# Patient Record
Sex: MALE | Race: WHITE
[De-identification: names, ages, dates, MRNs, and addresses within clinical notes are randomized per-mention and may not be internally consistent; named-entity substitution may affect disease eponyms.]

---

## 2018-01-12 NOTE — OR
DATE OF PROCEDURE:  01/11/2018

 

PROCEDURE:  Laparoscopic ventral hernia repair x2.

 

FINDINGS:

1. 1 cm hernia directly superior to previous repair.

2. 1.5 cm hernia around umbilicus.

 

COMPLICATIONS:  None.

 

ASSISTANT:  None.

 

ANESTHESIA:  General/local.

 

INDICATIONS:  This is a pleasant gentleman, who had a previous ventral hernia repair

____________________.

 

RISKS:  Risks, benefits, alternatives, and limitations including, but not limited to

infection, bleeding, and injury to abdominal structures such as bowel, bladder, and

requirement of open surgery were explained to the patient, who wished to proceed.

 

PROCEDURE IN DETAIL:  The patient was placed in left lateral decubitus position.  The hernia

would be addressed as follows:  An incision was made in the right abdomen.  This was

approximately 50 mm in size, and a Veress needle was used to enter the abdomen without

abnormality.  A drop test was performed without abnormality.  The abdomen was subsequently

insufflated, and this was followed by an Optiview trocar.  Two additional 5 mm ports were

also placed in the right lower and right upper abdomen.  Lysis of adhesions was then

commenced after the Optiview was introduced, and no evidence of enterotomy is noted.  The

adhesions were moderate.

 

The previous mesh was noted to be intact without abnormality.  The 2 previously mentioned

hernias were identified.  These both would be replaced with 7.5 cm circular mesh.  These

were both repaired in the same fashion by creating a defect in the skin, passing a suture

through, and then tacking into place.  Once this was completed, the abdomen was inspected

for abnormality, which there was none.  The remainder of the abdomen was inspected without

abnormality again a second time.  The air was desufflated.  The wound was closed with 3-0

Vicryl and 4-0 Vicryl interrupted in running fashion, and Dermabond was applied.  The

patient tolerated the procedure well.

 

 

 

 

Wolfgang Arvizu MD

DD:  01/11/2018 17:21:47

DT:  01/11/2018 23:23:14

Job #:  [id number]

## 2018-01-18 NOTE — DISCH
DISCHARGE DIAGNOSIS:  Status post laparoscopic ventral hernia.

 

HOSPITAL COURSE:  A pleasant 32-year-old male who underwent an uneventful laparoscopic

ventral hernia.

 

FOLLOWUP:  Follow up with Surgery in 7 to 14 days.

 

DISCHARGE MEDICATIONS:  Please see MAR, but include Norco for pain.

 

ACTIVITY:  No lifting greater than 30 pounds x30 days.

 

DIET:  As tolerated.

 

DRESSINGS:  No dressing changes needed as Dermabond was used.

## 2020-03-02 NOTE — CRLUS
Indication:



Right upper abdominal pain.



Technique:



Grayscale and color Doppler ultrasound the right upper quadrant was 

performed.



Comparison:



CT scan of the abdomen and pelvis dated February 10, 2020.



Findings:



The pancreas is not well visualized. Aorta is not well visualized. The 

liver is enlarged and has a coarsened echotexture. No intrahepatic biliary 

ductal dilatation is identified. No intrahepatic masses identified. The 

main portal vein is patent.



Sludge is identified within the gallbladder. A questionable 5 millimeter 

polyp versus stone is identified. This is a non shadowing nonmobile 

adherent mass. The gallbladder wall measures 2 mm in thickness. Sonographic 

Carrillo`s sign is not elicited. The common bile duct measures 5 mm and seen. 

The right kidney measures 11.5 x 5.1 x 4.5 cm in size. The cortex measures 

1.1 cm. No hydronephrosis is identified. The inferior vena cava is patent. 

No free fluid is identified in the right upper quadrant.



Impression:



Stone versus tumefactive sludge versus polyp measuring 5 millimeters within 

the gallbladder.



Hepatomegaly. Questionable diffuse fatty infiltration of the liver.



Dictated by Tiffanie Mueller MD @ Mar  2 2020  7:02PM



Signed by Dr. Tiffanie Mueller @ Mar  2 2020  7:04PM

## 2020-03-02 NOTE — EDM.PDOC
ED HPI GENERAL MEDICAL PROBLEM





- General


Chief Complaint: Chest Pain


Stated Complaint: SOB,CHEST PAIN


Time Seen by Provider: 03/02/20 16:57


Source of Information: Reports: Patient


History Limitations: Reports: No Limitations





- History of Present Illness


INITIAL COMMENTS - FREE TEXT/NARRATIVE: 





pt arrived stating that he is having pain in his chest going up to his neck. 

After he was here for a few  minutes the abdoman seemed to be the source of his 

pain. 


Onset: Today, Sudden


Duration: Hour(s):


Location: Reports: Abdomen


Associated Symptoms: Reports: Nausea/Vomiting, Other (pt did have chest pain in 

addition to the abdomanal pain)


  ** Left Chest


Pain Score (Numeric/FACES): 5





- Related Data


 Allergies











Allergy/AdvReac Type Severity Reaction Status Date / Time


 


acetaminophen [From Vicodin] Allergy  Itching Verified 01/10/18 10:07


 


hydrocodone [From Vicodin] Allergy  Itching Verified 01/10/18 10:07











Home Meds: 


 Home Meds





Omeprazole 20 mg PO DAILY 11/02/13 [History]


lisinopriL [Prinivil] 20 mg PO DAILY 11/02/13 [History]


hydroCHLOROthiazide [Hydrochlorothiazide] 25 mg PO DAILY 11/29/16 [History]


DULoxetine HCl [Cymbalta] 60 mg PO DAILY 01/10/18 [History]











Past Medical History


HEENT History: Reports: Impaired Vision


Cardiovascular History: Reports: Hypertension


Respiratory History: Reports: Asthma, Other (See Below)


Other Respiratory History: hx asthma when younger, no problems now


Gastrointestinal History: Reports: GERD


Genitourinary History: Reports: Renal Calculus


Musculoskeletal History: Reports: Amputation


Psychiatric History: Reports: Anxiety, Depression


Endocrine/Metabolic History: Reports: Obesity/BMI 30+


Dermatologic History: Reports: Eczema, Other (See Below)


Other Dermatologic History: hx eczema no problems now





- Infectious Disease History


Infectious Disease History: Reports: Chicken Pox





- Past Surgical History


Cardiovascular Surgical History: Reports: None


Respiratory Surgical History: Reports: None


GI Surgical History: Reports: Hernia, Abdominal, Other (See Below)


Other GI Surgeries/Procedures: hernia one year ago, same spot "ventral"


Endocrine Surgical History: Reports: None


Musculoskeletal Surgical History: Reports: Amputation, Other (See Below)


Dermatological Surgical History: Reports: None





Social & Family History





- Tobacco Use


Smoking Status *Q: Never Smoker





- Caffeine Use


Caffeine Use: Reports: Coffee, Soda, Tea


Other Caffeine Use: minimal mostly water





- Alcohol Use


Days Per Week of Alcohol Use: 7


Number of Drinks Per Day: 3


Total Drinks Per Week: 21


Date of Last Drink: 03/01/20





- Recreational Drug Use


Recreational Drug Use: No





ED ROS GENERAL





- Review of Systems


Review Of Systems: See Below


Constitutional: Reports: No Symptoms


HEENT: Reports: No Symptoms


Respiratory: Reports: No Symptoms


Cardiovascular: Reports: Chest Pain, Other ( this did relent and he was having 

abdomanal pain)


Endocrine: Reports: No Symptoms


GI/Abdominal: Reports: Abdominal Pain


: Reports: No Symptoms


Musculoskeletal: Reports: No Symptoms


Skin: Reports: No Symptoms


Neurological: Reports: No Symptoms


Psychiatric: Reports: No Symptoms





ED EXAM, GENERAL





- Physical Exam


Exam: See Below


Free Text/Narrative:: 





pt arrived with chest pain which seemed to rapidly center to his rt upper 

abdoman. 


Exam Limited By: No Limitations


General Appearance: Alert, Anxious, Severe Distress


Ears: Normal TMs


Nose: Normal Inspection


Throat/Mouth: Normal Inspection


Head: Atraumatic


Neck: Normal Inspection


Respiratory/Chest: No Respiratory Distress


Cardiovascular: Regular Rate, Rhythm, Tachycardia, Other (pt was complaining of 

mid chest pain. )


GI/Abdominal: Guarding, Other (pt had definite tenderness  in rt upper abdoman. 

He states he feels like he has  mass pushing in the area. )


Rectal (Males) Exam: Deferred


Back Exam: Normal Inspection


Extremities: Normal Inspection


Neurological: Alert, Oriented, Normal Cognition


Psychiatric: Anxious





Course





- Vital Signs


Last Recorded V/S: 


 Last Vital Signs











Temp  36.0 C L  03/02/20 16:43


 


Pulse  68   03/02/20 17:54


 


Resp  15   03/02/20 17:54


 


BP  165/97 H  03/02/20 17:54


 


Pulse Ox  95   03/02/20 17:54














- Orders/Labs/Meds


Labs: 


 Laboratory Tests











  03/02/20 03/02/20 03/02/20 Range/Units





  16:45 16:45 16:45 


 


WBC  6.1    (4.5-11.0)  K/uL


 


RBC  4.90    (4.30-5.90)  M/uL


 


Hgb  16.2 H    (12.0-15.0)  g/dL


 


Hct  47.9    (40.0-54.0)  %


 


MCV  98    (80-98)  fL


 


MCH  33 H    (27-31)  pg


 


MCHC  34    (32-36)  %


 


Plt Count  154    (150-400)  K/uL


 


Neut % (Auto)  65    (36-66)  %


 


Lymph % (Auto)  21 L    (24-44)  %


 


Mono % (Auto)  12 H    (2-6)  %


 


Eos % (Auto)  2    (2-4)  %


 


Baso % (Auto)  0    (0-1)  %


 


Sodium   137 L   (140-148)  mmol/L


 


Potassium   3.0 L   (3.6-5.2)  mmol/L


 


Chloride   99 L   (100-108)  mmol/L


 


Carbon Dioxide   22   (21-32)  mmol/L


 


Anion Gap   19.0 H   (5.0-14.0)  mmol/L


 


BUN   6 L   (7-18)  mg/dL


 


Creatinine   1.0   (0.8-1.3)  mg/dL


 


Est Cr Clr Drug Dosing   109.81   mL/min


 


Estimated GFR (MDRD)   > 60   (>60)  


 


Glucose   113 H   ()  mg/dL


 


Calcium   8.7   (8.5-10.1)  mg/dL


 


Total Bilirubin   2.6 H   (0.2-1.0)  mg/dL


 


AST   90 H   (15-37)  U/L


 


ALT   44   (12-78)  U/L


 


Alkaline Phosphatase   64   ()  U/L


 


Troponin I    < 0.017  (0.000-0.056)  ng/mL


 


Total Protein   7.9   (6.4-8.2)  g/dL


 


Albumin   4.1   (3.4-5.0)  g/dL


 


Globulin   3.8 H   (2.3-3.5)  g/dL


 


Albumin/Globulin Ratio   1.1 L   (1.2-2.2)  


 


Lipase     ()  U/L


 


Urine Color     (YELLOW)  


 


Urine Appearance     (CLEAR)  


 


Urine pH     (5.0-8.0)  


 


Ur Specific Gravity     (1.008-1.030)  


 


Urine Protein     (NEGATIVE)  mg/dL


 


Urine Glucose (UA)     (NEGATIVE)  mg/dL


 


Urine Ketones     (NEGATIVE)  mg/dL


 


Urine Occult Blood     (NEGATIVE)  


 


Urine Nitrite     (NEGATIVE)  


 


Urine Bilirubin     (NEGATIVE)  


 


Urine Urobilinogen     (0.2-1.0)  EU/dL


 


Ur Leukocyte Esterase     (NEGATIVE)  


 


Urine RBC     (0-5)  


 


Urine WBC     (0-5)  


 


Ur Epithelial Cells     


 


Amorphous Sediment     


 


Urine Bacteria     


 


Urine Mucus     














  03/02/20 03/02/20 Range/Units





  16:45 18:55 


 


WBC    (4.5-11.0)  K/uL


 


RBC    (4.30-5.90)  M/uL


 


Hgb    (12.0-15.0)  g/dL


 


Hct    (40.0-54.0)  %


 


MCV    (80-98)  fL


 


MCH    (27-31)  pg


 


MCHC    (32-36)  %


 


Plt Count    (150-400)  K/uL


 


Neut % (Auto)    (36-66)  %


 


Lymph % (Auto)    (24-44)  %


 


Mono % (Auto)    (2-6)  %


 


Eos % (Auto)    (2-4)  %


 


Baso % (Auto)    (0-1)  %


 


Sodium    (140-148)  mmol/L


 


Potassium    (3.6-5.2)  mmol/L


 


Chloride    (100-108)  mmol/L


 


Carbon Dioxide    (21-32)  mmol/L


 


Anion Gap    (5.0-14.0)  mmol/L


 


BUN    (7-18)  mg/dL


 


Creatinine    (0.8-1.3)  mg/dL


 


Est Cr Clr Drug Dosing    mL/min


 


Estimated GFR (MDRD)    (>60)  


 


Glucose    ()  mg/dL


 


Calcium    (8.5-10.1)  mg/dL


 


Total Bilirubin    (0.2-1.0)  mg/dL


 


AST    (15-37)  U/L


 


ALT    (12-78)  U/L


 


Alkaline Phosphatase    ()  U/L


 


Troponin I    (0.000-0.056)  ng/mL


 


Total Protein    (6.4-8.2)  g/dL


 


Albumin    (3.4-5.0)  g/dL


 


Globulin    (2.3-3.5)  g/dL


 


Albumin/Globulin Ratio    (1.2-2.2)  


 


Lipase  222   ()  U/L


 


Urine Color   Yellow  (YELLOW)  


 


Urine Appearance   Clear  (CLEAR)  


 


Urine pH   7.0  (5.0-8.0)  


 


Ur Specific Gravity   1.025  (1.008-1.030)  


 


Urine Protein   Negative  (NEGATIVE)  mg/dL


 


Urine Glucose (UA)   Negative  (NEGATIVE)  mg/dL


 


Urine Ketones   Negative  (NEGATIVE)  mg/dL


 


Urine Occult Blood   Negative  (NEGATIVE)  


 


Urine Nitrite   Negative  (NEGATIVE)  


 


Urine Bilirubin   Negative  (NEGATIVE)  


 


Urine Urobilinogen   0.2  (0.2-1.0)  EU/dL


 


Ur Leukocyte Esterase   Negative  (NEGATIVE)  


 


Urine RBC   0-5  (0-5)  


 


Urine WBC   Not seen  (0-5)  


 


Ur Epithelial Cells   Not seen  


 


Amorphous Sediment   Not seen  


 


Urine Bacteria   Not seen  


 


Urine Mucus   Not seen  











Meds: 


Medications














Discontinued Medications














Generic Name Dose Route Start Last Admin





  Trade Name Freq  PRN Reason Stop Dose Admin


 


Hydromorphone HCl  0.5 mg  03/02/20 16:56  03/02/20 17:03





  Dilaudid  IVPUSH  03/02/20 16:57  0.5 mg





  ONETIME ONE   Administration





     





     





     





     


 


Sodium Chloride  1,000 mls @ 999 mls/hr  03/02/20 17:00  03/02/20 17:05





  Normal Saline  IV   999 mls/hr





  ASDIRECTED YAKOV   Administration





     





     





     





     


 


Ondansetron HCl  4 mg  03/02/20 16:56  03/02/20 17:05





  Zofran  IVPUSH  03/02/20 16:57  4 mg





  ONETIME ONE   Administration





     





     





     





     














- Re-Assessments/Exams


Free Text/Narrative Re-Assessment/Exam: 





03/02/20 17:33


pt has a normal wbc, bilirubin is 2 and his sgot is elevated.  He had a cat 

scan 2 weeks ago which showed an enlarged spleen and liver. 





Departure





- Departure


Time of Disposition: 17:55


Disposition: Home, Self-Care 01


Condition: Fair


Clinical Impression: 


 Cholecystitis





Referrals: 


Pb Ch NP [Primary Care Provider] - 


Forms:  ED Department Discharge


Care Plan Goals: 


admit to hosp





Sepsis Event Note





- Evaluation


Sepsis Screening Result: No Definite Risk





- Focused Exam


Date Exam was Performed: 03/21/20


Time Exam was Performed: 19:25

## 2023-02-02 ENCOUNTER — HOSPITAL ENCOUNTER (EMERGENCY)
Dept: HOSPITAL 11 - JP.ED | Age: 38
Discharge: HOME | End: 2023-02-02
Payer: COMMERCIAL

## 2023-02-02 VITALS — SYSTOLIC BLOOD PRESSURE: 164 MMHG | DIASTOLIC BLOOD PRESSURE: 92 MMHG | HEART RATE: 77 BPM

## 2023-02-02 DIAGNOSIS — Z79.899: ICD-10-CM

## 2023-02-02 DIAGNOSIS — Z88.5: ICD-10-CM

## 2023-02-02 DIAGNOSIS — K21.9: ICD-10-CM

## 2023-02-02 DIAGNOSIS — I10: ICD-10-CM

## 2023-02-02 DIAGNOSIS — K59.00: Primary | ICD-10-CM

## 2023-02-02 DIAGNOSIS — E66.9: ICD-10-CM

## 2023-02-02 DIAGNOSIS — J45.909: ICD-10-CM

## 2025-01-31 ENCOUNTER — HOSPITAL ENCOUNTER (OUTPATIENT)
Dept: HOSPITAL 11 - JP.SDS | Age: 40
Discharge: HOME | End: 2025-01-31
Attending: SURGERY
Payer: COMMERCIAL

## 2025-01-31 VITALS — DIASTOLIC BLOOD PRESSURE: 74 MMHG | HEART RATE: 68 BPM | SYSTOLIC BLOOD PRESSURE: 123 MMHG

## 2025-01-31 DIAGNOSIS — Z79.899: ICD-10-CM

## 2025-01-31 DIAGNOSIS — F32.A: ICD-10-CM

## 2025-01-31 DIAGNOSIS — K29.00: Primary | ICD-10-CM

## 2025-01-31 DIAGNOSIS — I10: ICD-10-CM

## 2025-01-31 DIAGNOSIS — K22.89: ICD-10-CM

## 2025-01-31 DIAGNOSIS — K21.9: ICD-10-CM

## 2025-01-31 PROCEDURE — 00731 ANES UPR GI NDSC PX NOS: CPT

## 2025-01-31 PROCEDURE — 43239 EGD BIOPSY SINGLE/MULTIPLE: CPT

## 2025-06-13 ENCOUNTER — HOSPITAL ENCOUNTER (EMERGENCY)
Dept: HOSPITAL 11 - JP.ED | Age: 40
Discharge: HOME | End: 2025-06-13
Payer: COMMERCIAL

## 2025-06-13 VITALS — SYSTOLIC BLOOD PRESSURE: 135 MMHG | HEART RATE: 72 BPM | DIASTOLIC BLOOD PRESSURE: 82 MMHG

## 2025-06-13 DIAGNOSIS — Z88.5: ICD-10-CM

## 2025-06-13 DIAGNOSIS — R06.4: ICD-10-CM

## 2025-06-13 DIAGNOSIS — K76.6: ICD-10-CM

## 2025-06-13 DIAGNOSIS — Z79.899: ICD-10-CM

## 2025-06-13 DIAGNOSIS — Z86.16: ICD-10-CM

## 2025-06-13 DIAGNOSIS — K21.9: ICD-10-CM

## 2025-06-13 DIAGNOSIS — Z88.8: ICD-10-CM

## 2025-06-13 DIAGNOSIS — K70.30: Primary | ICD-10-CM

## 2025-06-13 DIAGNOSIS — E66.9: ICD-10-CM

## 2025-06-13 DIAGNOSIS — J45.909: ICD-10-CM

## 2025-06-13 DIAGNOSIS — E83.42: ICD-10-CM

## 2025-06-13 LAB
ALBUMIN SERPL-MCNC: 3.3 G/DL (ref 3.4–5)
ALBUMIN/GLOB SERPL: 0.7 {RATIO} (ref 1.2–2.2)
ALP SERPL-CCNC: 88 U/L (ref 46–116)
ALT SERPL-CCNC: 99 U/L (ref 12–78)
AMORPH SED URNS QL MICRO: (no result)
AMPHET UR QL SCN: NEGATIVE
AMPHET UR QL SCN: NEGATIVE
ANION GAP SERPL CALC-SCNC: 14.8 MMOL/L (ref 5–14)
ANNOTATION COMMENT IMP: (no result)
APPEARANCE UR: (no result)
AST SERPL-CCNC: 193 U/L (ref 15–37)
BACTERIA URNS QL MICRO: (no result)
BARBITURATES UR QL SCN: NEGATIVE
BASE EXCESS BLDV CALC-SCNC: 3.8 MM/L
BASOPHILS # BLD AUTO: 0.02 K/UL (ref 0–0.1)
BASOPHILS NFR BLD AUTO: 0.5 % (ref 0.1–1.3)
BENZODIAZ UR QL SCN: NEGATIVE
BILIRUB SERPL-MCNC: 4.2 MG/DL (ref 0.2–1)
BILIRUB UR STRIP-MCNC: (no result) MG/DL
BUN SERPL-MCNC: 11 MG/DL (ref 7–18)
BUN/CREAT SERPL: (no result)
CALCIUM SERPL-MCNC: 8.6 MG/DL (ref 8.5–10.1)
CHLORIDE SERPL-SCNC: 99 MMOL/L (ref 100–108)
CO2 SERPL-SCNC: 25 MMOL/L (ref 21–32)
COHGB MFR BLDA: 3.3 % (ref 0–1.6)
COLOR UR: (no result)
CREAT CL 24H UR+SERPL-VRATE: 104.58 ML/MIN
CREAT SERPL-MCNC: 1 MG/DL (ref 0.8–1.3)
CRP SERPL-MCNC: 2.59 MG/DL (ref ?–0.5)
EOSINOPHIL # BLD AUTO: 0.01 K/UL (ref 0–0.4)
EOSINOPHIL NFR BLD AUTO: 0.3 % (ref 0–5.4)
EPI CELLS #/AREA URNS HPF: (no result) /[HPF]
GLOBULIN SER-MCNC: 4.6 G/DL (ref 2.3–3.5)
GLUCOSE SERPL-MCNC: 121 MG/DL (ref 74–106)
GLUCOSE UR STRIP-MCNC: NEGATIVE MG/DL
HCO3 BLDV-SCNC: 25 MMOL/L
HCT VFR BLD AUTO: 45.1 % (ref 38.4–49.7)
HGB BLD-MCNC: 16.1 G/DL (ref 12.9–16.9)
HGB BLDA-MCNC: 16.8 G/DL (ref 13.5–18)
IMM GRANULOCYTES # BLD: 0.01 K/UL (ref 0–0.23)
IMM GRANULOCYTES NFR BLD: 0.3 % (ref 0–0.7)
KETONES UR STRIP-MCNC: 40 MG/DL
LEUKOCYTE ESTERASE UR QL STRIP: NEGATIVE
LIPASE SERPL-CCNC: 42 U/L (ref 16–77)
LYMPHOCYTES # BLD AUTO: 0.55 K/UL (ref 0.8–3.3)
LYMPHOCYTES NFR BLD AUTO: 14.4 % (ref 11.4–47.7)
MAGNESIUM SERPL-MCNC: 1.3 MG/DL (ref 1.8–2.4)
MCH RBC QN AUTO: 34.3 PG (ref 31.6–35.5)
MCHC RBC AUTO-ENTMCNC: 35.7 G/DL (ref 31.6–35.5)
MCHC RBC AUTO-ENTMCNC: 96.2 FL (ref 81.4–99)
METHADONE UR QL SCN: NEGATIVE
METHGB MFR BLDA: 0.8 %
MONOCYTES # BLD AUTO: 0.53 K/UL (ref 0.2–0.9)
MONOCYTES NFR BLD AUTO: 13.9 % (ref 3.3–12.6)
MUCOUS THREADS URNS QL MICRO: (no result)
NEUTROPHILS # BLD AUTO: 2.69 K/UL (ref 1–7.6)
NEUTROPHILS NFR BLD AUTO: 70.6 % (ref 40–78.1)
NITRITE UR QL: NEGATIVE
OTHER ELEMENTS URNS MICRO: (no result)
OXYCODONE UR QL SCN: (no result)
OXYHGB MFR BLDA: 86.3 %
PCO2 BLDV: 29.5 MM/HG
PH BLDV: 7.54 [PH] (ref 7.35–7.45)
PH UR STRIP: 7 [PH] (ref 5–8)
PLATELET # BLD AUTO: 126 K/UL (ref 130–375)
PO2 BLDV: 53 MM/HG
POTASSIUM SERPL-SCNC: 3.8 MMOL/L (ref 3.6–5.2)
PROPOXYPH UR QL SCN: NEGATIVE
PROT SERPL-MCNC: 7.9 G/DL (ref 6.4–8.2)
PROT UR STRIP-MCNC: 100 MG/DL
RBC # BLD AUTO: 4.69 M/UL (ref 4.14–5.76)
RBC UR QL: (no result)
SAO2 % BLDV: 90 %
SODIUM SERPL-SCNC: 135 MMOL/L (ref 140–148)
SP GR UR STRIP: 1.01 (ref 1.01–1.03)
THC UR QL SCN>50 NG/ML: (no result)
UROBILINOGEN UR STRIP-ACNC: >=8 EU/DL (ref 0.2–1)
WBC # BLD AUTO: 3.8 K/UL (ref 3.2–11)
WBC UR QL: (no result) (ref 0–5)

## 2025-06-13 PROCEDURE — 80305 DRUG TEST PRSMV DIR OPT OBS: CPT

## 2025-06-13 PROCEDURE — 83690 ASSAY OF LIPASE: CPT

## 2025-06-13 PROCEDURE — 96361 HYDRATE IV INFUSION ADD-ON: CPT

## 2025-06-13 PROCEDURE — 96365 THER/PROPH/DIAG IV INF INIT: CPT

## 2025-06-13 PROCEDURE — 81001 URINALYSIS AUTO W/SCOPE: CPT

## 2025-06-13 PROCEDURE — 36415 COLL VENOUS BLD VENIPUNCTURE: CPT

## 2025-06-13 PROCEDURE — 87428 SARSCOV & INF VIR A&B AG IA: CPT

## 2025-06-13 PROCEDURE — 83605 ASSAY OF LACTIC ACID: CPT

## 2025-06-13 PROCEDURE — 76705 ECHO EXAM OF ABDOMEN: CPT

## 2025-06-13 PROCEDURE — 74177 CT ABD & PELVIS W/CONTRAST: CPT

## 2025-06-13 PROCEDURE — 86140 C-REACTIVE PROTEIN: CPT

## 2025-06-13 PROCEDURE — 85025 COMPLETE CBC W/AUTO DIFF WBC: CPT

## 2025-06-13 PROCEDURE — 71045 X-RAY EXAM CHEST 1 VIEW: CPT

## 2025-06-13 PROCEDURE — 96375 TX/PRO/DX INJ NEW DRUG ADDON: CPT

## 2025-06-13 PROCEDURE — 80307 DRUG TEST PRSMV CHEM ANLYZR: CPT

## 2025-06-13 PROCEDURE — 96366 THER/PROPH/DIAG IV INF ADDON: CPT

## 2025-06-13 PROCEDURE — 86308 HETEROPHILE ANTIBODY SCREEN: CPT

## 2025-06-13 PROCEDURE — 83735 ASSAY OF MAGNESIUM: CPT

## 2025-06-13 PROCEDURE — 80053 COMPREHEN METABOLIC PANEL: CPT

## 2025-06-13 PROCEDURE — 82803 BLOOD GASES ANY COMBINATION: CPT

## 2025-06-13 PROCEDURE — 99284 EMERGENCY DEPT VISIT MOD MDM: CPT

## 2025-06-13 RX ADMIN — SODIUM CHLORIDE ONE MLS/HR: 0.9 INJECTION, SOLUTION INTRAVENOUS at 08:16

## 2025-06-13 RX ADMIN — KETOROLAC TROMETHAMINE ONE MG: 30 INJECTION, SOLUTION INTRAMUSCULAR at 08:16

## 2025-06-13 RX ADMIN — MAGNESIUM SULFATE IN WATER ONE MLS/HR: 40 INJECTION, SOLUTION INTRAVENOUS at 09:26

## 2025-06-13 RX ADMIN — PROCHLORPERAZINE EDISYLATE ONE MG: 5 INJECTION INTRAMUSCULAR; INTRAVENOUS at 08:16
